# Patient Record
Sex: MALE | Race: WHITE
[De-identification: names, ages, dates, MRNs, and addresses within clinical notes are randomized per-mention and may not be internally consistent; named-entity substitution may affect disease eponyms.]

---

## 2019-09-20 ENCOUNTER — HOSPITAL ENCOUNTER (INPATIENT)
Dept: HOSPITAL 84 - D.ER | Age: 84
LOS: 7 days | Discharge: HOME | DRG: 208 | End: 2019-09-27
Attending: FAMILY MEDICINE | Admitting: FAMILY MEDICINE
Payer: MEDICARE

## 2019-09-20 VITALS — SYSTOLIC BLOOD PRESSURE: 109 MMHG | DIASTOLIC BLOOD PRESSURE: 48 MMHG

## 2019-09-20 VITALS — DIASTOLIC BLOOD PRESSURE: 48 MMHG | SYSTOLIC BLOOD PRESSURE: 119 MMHG

## 2019-09-20 VITALS — SYSTOLIC BLOOD PRESSURE: 81 MMHG | DIASTOLIC BLOOD PRESSURE: 38 MMHG

## 2019-09-20 VITALS — DIASTOLIC BLOOD PRESSURE: 44 MMHG | SYSTOLIC BLOOD PRESSURE: 98 MMHG

## 2019-09-20 VITALS — SYSTOLIC BLOOD PRESSURE: 97 MMHG | DIASTOLIC BLOOD PRESSURE: 44 MMHG

## 2019-09-20 VITALS — DIASTOLIC BLOOD PRESSURE: 37 MMHG | SYSTOLIC BLOOD PRESSURE: 79 MMHG

## 2019-09-20 VITALS — DIASTOLIC BLOOD PRESSURE: 56 MMHG | SYSTOLIC BLOOD PRESSURE: 122 MMHG

## 2019-09-20 VITALS — DIASTOLIC BLOOD PRESSURE: 40 MMHG | SYSTOLIC BLOOD PRESSURE: 113 MMHG

## 2019-09-20 VITALS
BODY MASS INDEX: 26.98 KG/M2 | BODY MASS INDEX: 26.98 KG/M2 | WEIGHT: 171.92 LBS | BODY MASS INDEX: 26.98 KG/M2 | BODY MASS INDEX: 26.98 KG/M2 | HEIGHT: 67 IN | WEIGHT: 171.92 LBS | HEIGHT: 67 IN

## 2019-09-20 VITALS — SYSTOLIC BLOOD PRESSURE: 109 MMHG | DIASTOLIC BLOOD PRESSURE: 40 MMHG

## 2019-09-20 VITALS — DIASTOLIC BLOOD PRESSURE: 51 MMHG | SYSTOLIC BLOOD PRESSURE: 109 MMHG

## 2019-09-20 VITALS — SYSTOLIC BLOOD PRESSURE: 127 MMHG | DIASTOLIC BLOOD PRESSURE: 48 MMHG

## 2019-09-20 VITALS — DIASTOLIC BLOOD PRESSURE: 45 MMHG | SYSTOLIC BLOOD PRESSURE: 99 MMHG

## 2019-09-20 VITALS — SYSTOLIC BLOOD PRESSURE: 115 MMHG | DIASTOLIC BLOOD PRESSURE: 49 MMHG

## 2019-09-20 VITALS — DIASTOLIC BLOOD PRESSURE: 49 MMHG | SYSTOLIC BLOOD PRESSURE: 130 MMHG

## 2019-09-20 VITALS — DIASTOLIC BLOOD PRESSURE: 51 MMHG | SYSTOLIC BLOOD PRESSURE: 129 MMHG

## 2019-09-20 VITALS — DIASTOLIC BLOOD PRESSURE: 45 MMHG | SYSTOLIC BLOOD PRESSURE: 117 MMHG

## 2019-09-20 VITALS — DIASTOLIC BLOOD PRESSURE: 39 MMHG | SYSTOLIC BLOOD PRESSURE: 82 MMHG

## 2019-09-20 VITALS — DIASTOLIC BLOOD PRESSURE: 36 MMHG | SYSTOLIC BLOOD PRESSURE: 92 MMHG

## 2019-09-20 VITALS — DIASTOLIC BLOOD PRESSURE: 42 MMHG | SYSTOLIC BLOOD PRESSURE: 105 MMHG

## 2019-09-20 VITALS — SYSTOLIC BLOOD PRESSURE: 114 MMHG | DIASTOLIC BLOOD PRESSURE: 40 MMHG

## 2019-09-20 VITALS — DIASTOLIC BLOOD PRESSURE: 40 MMHG | SYSTOLIC BLOOD PRESSURE: 86 MMHG

## 2019-09-20 VITALS — DIASTOLIC BLOOD PRESSURE: 43 MMHG | SYSTOLIC BLOOD PRESSURE: 116 MMHG

## 2019-09-20 VITALS — DIASTOLIC BLOOD PRESSURE: 38 MMHG | SYSTOLIC BLOOD PRESSURE: 108 MMHG

## 2019-09-20 VITALS — DIASTOLIC BLOOD PRESSURE: 45 MMHG | SYSTOLIC BLOOD PRESSURE: 114 MMHG

## 2019-09-20 VITALS — DIASTOLIC BLOOD PRESSURE: 47 MMHG | SYSTOLIC BLOOD PRESSURE: 111 MMHG

## 2019-09-20 VITALS — DIASTOLIC BLOOD PRESSURE: 47 MMHG | SYSTOLIC BLOOD PRESSURE: 99 MMHG

## 2019-09-20 VITALS — DIASTOLIC BLOOD PRESSURE: 47 MMHG | SYSTOLIC BLOOD PRESSURE: 117 MMHG

## 2019-09-20 VITALS — DIASTOLIC BLOOD PRESSURE: 60 MMHG | SYSTOLIC BLOOD PRESSURE: 131 MMHG

## 2019-09-20 VITALS — SYSTOLIC BLOOD PRESSURE: 118 MMHG | DIASTOLIC BLOOD PRESSURE: 48 MMHG

## 2019-09-20 VITALS — SYSTOLIC BLOOD PRESSURE: 133 MMHG | DIASTOLIC BLOOD PRESSURE: 48 MMHG

## 2019-09-20 VITALS — DIASTOLIC BLOOD PRESSURE: 42 MMHG | SYSTOLIC BLOOD PRESSURE: 112 MMHG

## 2019-09-20 VITALS — SYSTOLIC BLOOD PRESSURE: 105 MMHG | DIASTOLIC BLOOD PRESSURE: 53 MMHG

## 2019-09-20 VITALS — DIASTOLIC BLOOD PRESSURE: 40 MMHG | SYSTOLIC BLOOD PRESSURE: 97 MMHG

## 2019-09-20 VITALS — SYSTOLIC BLOOD PRESSURE: 109 MMHG | DIASTOLIC BLOOD PRESSURE: 45 MMHG

## 2019-09-20 VITALS — SYSTOLIC BLOOD PRESSURE: 126 MMHG | DIASTOLIC BLOOD PRESSURE: 48 MMHG

## 2019-09-20 VITALS — DIASTOLIC BLOOD PRESSURE: 63 MMHG | SYSTOLIC BLOOD PRESSURE: 92 MMHG

## 2019-09-20 VITALS — DIASTOLIC BLOOD PRESSURE: 44 MMHG | SYSTOLIC BLOOD PRESSURE: 110 MMHG

## 2019-09-20 VITALS — DIASTOLIC BLOOD PRESSURE: 44 MMHG | SYSTOLIC BLOOD PRESSURE: 95 MMHG

## 2019-09-20 VITALS — SYSTOLIC BLOOD PRESSURE: 131 MMHG | DIASTOLIC BLOOD PRESSURE: 49 MMHG

## 2019-09-20 VITALS — DIASTOLIC BLOOD PRESSURE: 47 MMHG | SYSTOLIC BLOOD PRESSURE: 106 MMHG

## 2019-09-20 DIAGNOSIS — E87.6: ICD-10-CM

## 2019-09-20 DIAGNOSIS — J15.6: ICD-10-CM

## 2019-09-20 DIAGNOSIS — I13.0: ICD-10-CM

## 2019-09-20 DIAGNOSIS — A41.9: ICD-10-CM

## 2019-09-20 DIAGNOSIS — I50.33: ICD-10-CM

## 2019-09-20 DIAGNOSIS — K72.00: ICD-10-CM

## 2019-09-20 DIAGNOSIS — N18.3: ICD-10-CM

## 2019-09-20 DIAGNOSIS — E03.9: ICD-10-CM

## 2019-09-20 DIAGNOSIS — M19.90: ICD-10-CM

## 2019-09-20 DIAGNOSIS — J44.1: ICD-10-CM

## 2019-09-20 DIAGNOSIS — J96.01: Primary | ICD-10-CM

## 2019-09-20 DIAGNOSIS — R65.21: ICD-10-CM

## 2019-09-20 DIAGNOSIS — J44.0: ICD-10-CM

## 2019-09-20 DIAGNOSIS — I21.4: ICD-10-CM

## 2019-09-20 LAB
ALBUMIN SERPL-MCNC: 2.8 G/DL (ref 3.4–5)
ALP SERPL-CCNC: 272 U/L (ref 46–116)
ALT SERPL-CCNC: 89 U/L (ref 10–68)
ANION GAP SERPL CALC-SCNC: 14.8 MMOL/L (ref 8–16)
APPEARANCE UR: (no result)
APTT BLD: 83.6 SECONDS (ref 22.8–39.4)
BACTERIA #/AREA URNS HPF: (no result) /HPF
BILIRUB SERPL-MCNC: 1.41 MG/DL (ref 0.2–1.3)
BILIRUB SERPL-MCNC: NEGATIVE MG/DL
BUN SERPL-MCNC: 46 MG/DL (ref 7–18)
CALCIUM SERPL-MCNC: 8.4 MG/DL (ref 8.5–10.1)
CHLORIDE SERPL-SCNC: 102 MMOL/L (ref 98–107)
CK MB SERPL-MCNC: 21.4 U/L (ref 0–3.6)
CK SERPL-CCNC: 147 UL (ref 21–232)
CO2 SERPL-SCNC: 23.1 MMOL/L (ref 21–32)
COLOR UR: YELLOW
CREAT SERPL-MCNC: 1.6 MG/DL (ref 0.6–1.3)
ERYTHROCYTE [DISTWIDTH] IN BLOOD BY AUTOMATED COUNT: 13.4 % (ref 11.5–14.5)
GLOBULIN SER-MCNC: 3.8 G/L
GLUCOSE SERPL-MCNC: 168 MG/DL (ref 74–106)
GLUCOSE SERPL-MCNC: NEGATIVE MG/DL
HCT VFR BLD CALC: 29.7 % (ref 42–54)
HGB BLD-MCNC: 10.2 G/DL (ref 13.5–17.5)
INR PPP: 1.83 (ref 0.85–1.17)
KETONES UR STRIP-MCNC: NEGATIVE MG/DL
LYMPHOCYTES NFR BLD AUTO: 5.9 % (ref 15–50)
MCH RBC QN AUTO: 29.7 PG (ref 26–34)
MCHC RBC AUTO-ENTMCNC: 34.3 G/DL (ref 31–37)
MCV RBC: 86.3 FL (ref 80–100)
MUCOUS THREADS #/AREA URNS LPF: (no result) /LPF
NEUTROPHILS NFR BLD AUTO: 86.6 % (ref 40–80)
NITRITE UR-MCNC: NEGATIVE MG/ML
OSMOLALITY SERPL CALC.SUM OF ELEC: 287 MOSM/KG (ref 275–300)
PH UR STRIP: 5 [PH] (ref 5–6)
PLATELET # BLD: 234 10X3/UL (ref 130–400)
PMV BLD AUTO: 8.8 FL (ref 7.4–10.4)
POTASSIUM SERPL-SCNC: 3.9 MMOL/L (ref 3.5–5.1)
PROT SERPL-MCNC: 6.6 G/DL (ref 6.4–8.2)
PROT UR-MCNC: NEGATIVE MG/DL
PROTHROMBIN TIME: 20.5 SECONDS (ref 11.6–15)
RBC # BLD AUTO: 3.44 10X6/UL (ref 4.2–6.1)
RBC #/AREA URNS HPF: (no result) /HPF (ref 0–5)
SODIUM SERPL-SCNC: 136 MMOL/L (ref 136–145)
SP GR UR STRIP: 1.01 (ref 1–1.02)
SQUAMOUS #/AREA URNS HPF: (no result) /HPF (ref 0–5)
TROPONIN I SERPL-MCNC: 4.85 NG/ML (ref 0–0.06)
UROBILINOGEN UR-MCNC: NORMAL MG/DL
WBC # BLD AUTO: 15.7 10X3/UL (ref 4.8–10.8)
WBC #/AREA URNS HPF: (no result) /HPF

## 2019-09-20 PROCEDURE — 05HM33Z INSERTION OF INFUSION DEVICE INTO RIGHT INTERNAL JUGULAR VEIN, PERCUTANEOUS APPROACH: ICD-10-PCS | Performed by: SURGERY

## 2019-09-20 PROCEDURE — 5A1945Z RESPIRATORY VENTILATION, 24-96 CONSECUTIVE HOURS: ICD-10-PCS | Performed by: FAMILY MEDICINE

## 2019-09-20 PROCEDURE — 05HY33Z INSERTION OF INFUSION DEVICE INTO UPPER VEIN, PERCUTANEOUS APPROACH: ICD-10-PCS | Performed by: FAMILY MEDICINE

## 2019-09-20 NOTE — NUR
OBTAINED CONSENT FROM DAUGHTER FOR CENTRAL LINE SINCE PICC LINE COULD NOT BE
PLACED.  DR. MOSLEY CONSULTED.  SUPPLIES GATHERED

## 2019-09-20 NOTE — NUR
LAB CALLED FOR ELEVATED TROPONIN OF 4.846, EDP NOTIFIED. EDP STATES TO CALL DR CARNEY. DR CARNEY NOTIFIED.

## 2019-09-20 NOTE — MORECARE
CASE MANAGEMENT DISCHARGE SUMMARY
 
 
PATIENT: CHINEDU DEL TORO                       UNIT: [id number]
ACCOUNT#: [id number]                       ADM DATE: 19
AGE: 89     : [birth year]  SEX: M            ROOM/BED: D.2305    
AUTHOR: KATHY HUBER                             PHYSICIAN:                               
 
REFERRING PHYSICIAN: STAN COVARRUBIAS MD           
DATE OF SERVICE: 19
Discharge Plan
 
 
Patient Name: CHINEDU DEL TORO
[facility]
Encounter #: [id number]
Medical Record #: [id number]
: 1930
Planned Disposition: 
Anticipated Discharge Date: 
 
Discharge Date: 
Expected LOS: 
Initial Reviewer: [id number]
Initial Review Date: 2019
Generated: 19   6:25 pm 
Comments
 
DCP- Discharge Planning
 
Updated by [email or web address]: Abbi Pike on 19   4:22 pm CT
CM attempted to visit with patient regarding discharge planning/ needs. 
Patient currently on vent no family available. CM will continue to follow and 
assist as needed with discharge planning / needs
  
 
 
 
 
 
 
 
Patient Name: CHINEDU DEL TORO
 
Encounter #: [id number]
Page [id number]
 
 
 
 
 
Electronically Signed by KATHY HUBER on 19 at 1726
 
 
 
 
 
 
**All edits/amendments must be made on the electronic document**
 
DICTATION DATE: 19     : CHRIS  19     
RPT#: [id number]                                DC DATE:        
                                               STATUS: ADM IN  
[facility]
 [place] [address]
***END OF REPORT***

## 2019-09-20 NOTE — NUR
PT LYING QUIETLY, INTUBATED, ON VENT.  P/W/D.  VSS.  PROPOFOL INFUSING, LR
INFUSING ON PRESSURE BAG, NOREPINEPHRINE INFUSING.  MAP 75.  1000ML TEA COLORED
URINE EMPTIED FROM WADSWORTH BAG.

## 2019-09-21 VITALS — DIASTOLIC BLOOD PRESSURE: 36 MMHG | SYSTOLIC BLOOD PRESSURE: 94 MMHG

## 2019-09-21 VITALS — DIASTOLIC BLOOD PRESSURE: 57 MMHG | SYSTOLIC BLOOD PRESSURE: 138 MMHG

## 2019-09-21 VITALS — SYSTOLIC BLOOD PRESSURE: 117 MMHG | DIASTOLIC BLOOD PRESSURE: 44 MMHG

## 2019-09-21 VITALS — DIASTOLIC BLOOD PRESSURE: 49 MMHG | SYSTOLIC BLOOD PRESSURE: 85 MMHG

## 2019-09-21 VITALS — SYSTOLIC BLOOD PRESSURE: 112 MMHG | DIASTOLIC BLOOD PRESSURE: 46 MMHG

## 2019-09-21 VITALS — SYSTOLIC BLOOD PRESSURE: 133 MMHG | DIASTOLIC BLOOD PRESSURE: 53 MMHG

## 2019-09-21 VITALS — DIASTOLIC BLOOD PRESSURE: 41 MMHG | SYSTOLIC BLOOD PRESSURE: 93 MMHG

## 2019-09-21 VITALS — DIASTOLIC BLOOD PRESSURE: 42 MMHG | SYSTOLIC BLOOD PRESSURE: 108 MMHG

## 2019-09-21 VITALS — SYSTOLIC BLOOD PRESSURE: 126 MMHG | DIASTOLIC BLOOD PRESSURE: 43 MMHG

## 2019-09-21 VITALS — SYSTOLIC BLOOD PRESSURE: 92 MMHG | DIASTOLIC BLOOD PRESSURE: 42 MMHG

## 2019-09-21 VITALS — DIASTOLIC BLOOD PRESSURE: 47 MMHG | SYSTOLIC BLOOD PRESSURE: 121 MMHG

## 2019-09-21 VITALS — SYSTOLIC BLOOD PRESSURE: 114 MMHG | DIASTOLIC BLOOD PRESSURE: 42 MMHG

## 2019-09-21 VITALS — DIASTOLIC BLOOD PRESSURE: 41 MMHG | SYSTOLIC BLOOD PRESSURE: 130 MMHG

## 2019-09-21 VITALS — DIASTOLIC BLOOD PRESSURE: 48 MMHG | SYSTOLIC BLOOD PRESSURE: 115 MMHG

## 2019-09-21 VITALS — DIASTOLIC BLOOD PRESSURE: 46 MMHG | SYSTOLIC BLOOD PRESSURE: 115 MMHG

## 2019-09-21 VITALS — SYSTOLIC BLOOD PRESSURE: 122 MMHG | DIASTOLIC BLOOD PRESSURE: 48 MMHG

## 2019-09-21 VITALS — SYSTOLIC BLOOD PRESSURE: 119 MMHG | DIASTOLIC BLOOD PRESSURE: 51 MMHG

## 2019-09-21 VITALS — DIASTOLIC BLOOD PRESSURE: 47 MMHG | SYSTOLIC BLOOD PRESSURE: 120 MMHG

## 2019-09-21 VITALS — SYSTOLIC BLOOD PRESSURE: 90 MMHG | DIASTOLIC BLOOD PRESSURE: 45 MMHG

## 2019-09-21 VITALS — DIASTOLIC BLOOD PRESSURE: 52 MMHG | SYSTOLIC BLOOD PRESSURE: 140 MMHG

## 2019-09-21 VITALS — DIASTOLIC BLOOD PRESSURE: 48 MMHG | SYSTOLIC BLOOD PRESSURE: 103 MMHG

## 2019-09-21 VITALS — DIASTOLIC BLOOD PRESSURE: 46 MMHG | SYSTOLIC BLOOD PRESSURE: 118 MMHG

## 2019-09-21 VITALS — SYSTOLIC BLOOD PRESSURE: 108 MMHG | DIASTOLIC BLOOD PRESSURE: 48 MMHG

## 2019-09-21 VITALS — SYSTOLIC BLOOD PRESSURE: 115 MMHG | DIASTOLIC BLOOD PRESSURE: 50 MMHG

## 2019-09-21 VITALS — DIASTOLIC BLOOD PRESSURE: 46 MMHG | SYSTOLIC BLOOD PRESSURE: 121 MMHG

## 2019-09-21 VITALS — DIASTOLIC BLOOD PRESSURE: 49 MMHG | SYSTOLIC BLOOD PRESSURE: 124 MMHG

## 2019-09-21 VITALS — SYSTOLIC BLOOD PRESSURE: 101 MMHG | DIASTOLIC BLOOD PRESSURE: 42 MMHG

## 2019-09-21 VITALS — DIASTOLIC BLOOD PRESSURE: 51 MMHG | SYSTOLIC BLOOD PRESSURE: 148 MMHG

## 2019-09-21 LAB
ALBUMIN SERPL-MCNC: 2.3 G/DL (ref 3.4–5)
ALP SERPL-CCNC: 187 U/L (ref 46–116)
ALT SERPL-CCNC: 57 U/L (ref 10–68)
ANION GAP SERPL CALC-SCNC: 15.3 MMOL/L (ref 8–16)
BASOPHILS NFR BLD AUTO: 0.1 % (ref 0–2)
BILIRUB SERPL-MCNC: 1.06 MG/DL (ref 0.2–1.3)
BUN SERPL-MCNC: 46 MG/DL (ref 7–18)
CALCIUM SERPL-MCNC: 7.9 MG/DL (ref 8.5–10.1)
CHLORIDE SERPL-SCNC: 104 MMOL/L (ref 98–107)
CO2 SERPL-SCNC: 23.9 MMOL/L (ref 21–32)
CREAT SERPL-MCNC: 1.5 MG/DL (ref 0.6–1.3)
EOSINOPHIL NFR BLD: 0.1 % (ref 0–7)
ERYTHROCYTE [DISTWIDTH] IN BLOOD BY AUTOMATED COUNT: 14 % (ref 11.5–14.5)
GLOBULIN SER-MCNC: 2.8 G/L
GLUCOSE SERPL-MCNC: 142 MG/DL (ref 74–106)
HCT VFR BLD CALC: 25 % (ref 42–54)
HGB BLD-MCNC: 8.5 G/DL (ref 13.5–17.5)
IMM GRANULOCYTES NFR BLD: 0.5 % (ref 0–5)
LYMPHOCYTES NFR BLD AUTO: 9.8 % (ref 15–50)
MAGNESIUM SERPL-MCNC: 1.9 MG/DL (ref 1.8–2.4)
MCH RBC QN AUTO: 28.6 PG (ref 26–34)
MCHC RBC AUTO-ENTMCNC: 34 G/DL (ref 31–37)
MCV RBC: 84.2 FL (ref 80–100)
MONOCYTES NFR BLD: 4 % (ref 2–11)
NEUTROPHILS NFR BLD AUTO: 85.5 % (ref 40–80)
OSMOLALITY SERPL CALC.SUM OF ELEC: 292 MOSM/KG (ref 275–300)
PHOSPHATE SERPL-MCNC: 4.3 MG/DL (ref 2.5–4.9)
PLATELET # BLD: 189 10X3/UL (ref 130–400)
PMV BLD AUTO: 8.7 FL (ref 7.4–10.4)
POTASSIUM SERPL-SCNC: 3.2 MMOL/L (ref 3.5–5.1)
PROT SERPL-MCNC: 5.1 G/DL (ref 6.4–8.2)
RBC # BLD AUTO: 2.97 10X6/UL (ref 4.2–6.1)
SODIUM SERPL-SCNC: 140 MMOL/L (ref 136–145)
TROPONIN I SERPL-MCNC: 2.55 NG/ML (ref 0–0.06)
VANCOMYCIN SERPL-MCNC: 0.1 UG/ML (ref 10–20)
WBC # BLD AUTO: 7.5 10X3/UL (ref 4.8–10.8)

## 2019-09-21 NOTE — OP
PATIENT NAME:  CHINEDU DEL TORO                             MEDICAL RECORD: [id number]
:30                                             LOCATION:.[facility]    D.2305
                                                         ADMISSION DATE:19
SURGEON:  YUVAL MOSLEY MD            
 
 
DATE OF OPERATION:  2019
 
PREOPERATIVE DIAGNOSES:
1.  Septicemia.
2.  Pneumonia.
3.  Ventilatory failure requiring mechanical ventilation.
 
POSTOPERATIVE DIAGNOSES:
1. Septicemia.
2. Pneumonia.
3. Ventilatory failure requiring mechanical ventilation.
 
PROCEDURE:  Insertion of right internal jugular triple lumen central venous
catheter, 16 cm.
 
SURGEON:  Yuval Mosley MD
 
ASSISTANT:  None.
 
BLOOD LOSS:  Minimal.
 
ANESTHESIA:  Local.
 
The entire procedure was performed in the presence of the patient's nurse.
 
OPERATIVE COURSE:  The patient was positioned in the Trendelenburg position. 
The right neck was sterilely prepped and draped.  A local anesthetic was used to
infiltrate the skin and subcutaneous tissues at the base of right neck.  Right
internal jugular vein was percutaneously accessed in an antegrade fashion. 
Guidewire was passed easily.  A small skin nick was accomplished.  A vessel
dilator was used to dilate a subcutaneous tract.  A 16-cm triple lumen central
venous catheter was inserted in to the hub.  It was sutured in place times 2. 
All lumens flushed easily and aspirated dark, nonpulsatile blood.
 
A stat portable chest x-ray is pending.
 
TRANSINT:[id number] Voice Confirmation ID: [id number] DOCUMENT ID: [id number]
                                           
                                           YUVAL MOSLEY MD            
 
 
 
Electronically Signed by YUVAL MOSLEY on 19 at 1702
 
CC:                                                             [id number]
DICTATION DATE: 19     :     19 0309      ADM IN  
                                                                              
[id number] [place] [address]

## 2019-09-21 NOTE — NUR
REASSESSMENT MADE CPOC REPOSITIONED FOR COMFORT PT LIGHTLY SEDATED STILL NODS
HEAD AND FOLLOWS COMMANDS

## 2019-09-22 VITALS — DIASTOLIC BLOOD PRESSURE: 55 MMHG | SYSTOLIC BLOOD PRESSURE: 130 MMHG

## 2019-09-22 VITALS — SYSTOLIC BLOOD PRESSURE: 135 MMHG | DIASTOLIC BLOOD PRESSURE: 54 MMHG

## 2019-09-22 VITALS — SYSTOLIC BLOOD PRESSURE: 129 MMHG | DIASTOLIC BLOOD PRESSURE: 53 MMHG

## 2019-09-22 VITALS — SYSTOLIC BLOOD PRESSURE: 131 MMHG | DIASTOLIC BLOOD PRESSURE: 56 MMHG

## 2019-09-22 VITALS — SYSTOLIC BLOOD PRESSURE: 125 MMHG | DIASTOLIC BLOOD PRESSURE: 48 MMHG

## 2019-09-22 VITALS — DIASTOLIC BLOOD PRESSURE: 55 MMHG | SYSTOLIC BLOOD PRESSURE: 125 MMHG

## 2019-09-22 VITALS — SYSTOLIC BLOOD PRESSURE: 131 MMHG | DIASTOLIC BLOOD PRESSURE: 50 MMHG

## 2019-09-22 VITALS — SYSTOLIC BLOOD PRESSURE: 116 MMHG | DIASTOLIC BLOOD PRESSURE: 46 MMHG

## 2019-09-22 VITALS — DIASTOLIC BLOOD PRESSURE: 43 MMHG | SYSTOLIC BLOOD PRESSURE: 107 MMHG

## 2019-09-22 VITALS — DIASTOLIC BLOOD PRESSURE: 48 MMHG | SYSTOLIC BLOOD PRESSURE: 126 MMHG

## 2019-09-22 VITALS — DIASTOLIC BLOOD PRESSURE: 56 MMHG | SYSTOLIC BLOOD PRESSURE: 142 MMHG

## 2019-09-22 VITALS — DIASTOLIC BLOOD PRESSURE: 49 MMHG | SYSTOLIC BLOOD PRESSURE: 125 MMHG

## 2019-09-22 VITALS — DIASTOLIC BLOOD PRESSURE: 60 MMHG | SYSTOLIC BLOOD PRESSURE: 133 MMHG

## 2019-09-22 VITALS — SYSTOLIC BLOOD PRESSURE: 133 MMHG | DIASTOLIC BLOOD PRESSURE: 53 MMHG

## 2019-09-22 VITALS — DIASTOLIC BLOOD PRESSURE: 46 MMHG | SYSTOLIC BLOOD PRESSURE: 149 MMHG

## 2019-09-22 VITALS — SYSTOLIC BLOOD PRESSURE: 124 MMHG | DIASTOLIC BLOOD PRESSURE: 64 MMHG

## 2019-09-22 VITALS — DIASTOLIC BLOOD PRESSURE: 50 MMHG | SYSTOLIC BLOOD PRESSURE: 138 MMHG

## 2019-09-22 VITALS — SYSTOLIC BLOOD PRESSURE: 123 MMHG | DIASTOLIC BLOOD PRESSURE: 47 MMHG

## 2019-09-22 VITALS — DIASTOLIC BLOOD PRESSURE: 87 MMHG | SYSTOLIC BLOOD PRESSURE: 139 MMHG

## 2019-09-22 VITALS — SYSTOLIC BLOOD PRESSURE: 131 MMHG | DIASTOLIC BLOOD PRESSURE: 60 MMHG

## 2019-09-22 VITALS — SYSTOLIC BLOOD PRESSURE: 143 MMHG | DIASTOLIC BLOOD PRESSURE: 62 MMHG

## 2019-09-22 VITALS — DIASTOLIC BLOOD PRESSURE: 38 MMHG | SYSTOLIC BLOOD PRESSURE: 101 MMHG

## 2019-09-22 VITALS — DIASTOLIC BLOOD PRESSURE: 45 MMHG | SYSTOLIC BLOOD PRESSURE: 126 MMHG

## 2019-09-22 VITALS — SYSTOLIC BLOOD PRESSURE: 129 MMHG | DIASTOLIC BLOOD PRESSURE: 58 MMHG

## 2019-09-22 VITALS — SYSTOLIC BLOOD PRESSURE: 143 MMHG | DIASTOLIC BLOOD PRESSURE: 61 MMHG

## 2019-09-22 VITALS — SYSTOLIC BLOOD PRESSURE: 124 MMHG | DIASTOLIC BLOOD PRESSURE: 74 MMHG

## 2019-09-22 VITALS — SYSTOLIC BLOOD PRESSURE: 137 MMHG | DIASTOLIC BLOOD PRESSURE: 59 MMHG

## 2019-09-22 LAB
ANION GAP SERPL CALC-SCNC: 11.5 MMOL/L (ref 8–16)
APTT BLD: 44.5 SECONDS (ref 22.8–39.4)
BASOPHILS NFR BLD AUTO: 0 % (ref 0–2)
BUN SERPL-MCNC: 44 MG/DL (ref 7–18)
CALCIUM SERPL-MCNC: 7.7 MG/DL (ref 8.5–10.1)
CHLORIDE SERPL-SCNC: 108 MMOL/L (ref 98–107)
CO2 SERPL-SCNC: 25.8 MMOL/L (ref 21–32)
CREAT SERPL-MCNC: 1.5 MG/DL (ref 0.6–1.3)
EOSINOPHIL NFR BLD: 0.1 % (ref 0–7)
ERYTHROCYTE [DISTWIDTH] IN BLOOD BY AUTOMATED COUNT: 14.1 % (ref 11.5–14.5)
GLUCOSE SERPL-MCNC: 152 MG/DL (ref 74–106)
HCT VFR BLD CALC: 23.8 % (ref 42–54)
HCT VFR BLD CALC: 30.3 % (ref 42–54)
HGB BLD-MCNC: 10.3 G/DL (ref 13.5–17.5)
HGB BLD-MCNC: 8 G/DL (ref 13.5–17.5)
IMM GRANULOCYTES NFR BLD: 0.5 % (ref 0–5)
INR PPP: 2.14 (ref 0.85–1.17)
LYMPHOCYTES NFR BLD AUTO: 8.8 % (ref 15–50)
MCH RBC QN AUTO: 28.4 PG (ref 26–34)
MCHC RBC AUTO-ENTMCNC: 33.6 G/DL (ref 31–37)
MCV RBC: 84.4 FL (ref 80–100)
MONOCYTES NFR BLD: 3.1 % (ref 2–11)
NEUTROPHILS NFR BLD AUTO: 87.5 % (ref 40–80)
OSMOLALITY SERPL CALC.SUM OF ELEC: 296 MOSM/KG (ref 275–300)
PLATELET # BLD: 202 10X3/UL (ref 130–400)
PMV BLD AUTO: 8.9 FL (ref 7.4–10.4)
POTASSIUM SERPL-SCNC: 3.3 MMOL/L (ref 3.5–5.1)
PROTHROMBIN TIME: 23.3 SECONDS (ref 11.6–15)
RBC # BLD AUTO: 2.82 10X6/UL (ref 4.2–6.1)
SODIUM SERPL-SCNC: 142 MMOL/L (ref 136–145)
VANCOMYCIN SERPL-MCNC: 8.1 UG/ML (ref 10–20)
WBC # BLD AUTO: 7.5 10X3/UL (ref 4.8–10.8)

## 2019-09-22 NOTE — NUR
RECEIVED BEDSIDE REPORT ON PATIENT AND ASSUMED CARE. PATIENT AWAKENS TO VOICE,
FOLLOWS COMMANDS. VSS. CM - SB RATE 75, SPO2 - 98% ON VENT WIHT 30% FIO2. IVS
INFUSING TO RIGHT IJ WITHOUT DIFFICULTY, LR  CC/HR, FENTANYL AT 1 CC/HR
(50 MCG/HR) AND PROPOFOL AT 3.1 CC/HR (8 MCG/KG/MIN). WADSWORTH CATH IN PLACE WITH
YELLOW UOP. PATIENT TURNED AND REPOSITIONED IN BED. HEAD TO TOE ASSESSMENT
COMPLETED.

## 2019-09-22 NOTE — NUR
SPOKE TO DR. HAHN REGARDING PATIENTS EYE GTTS TAKEN FOR HIS GLAUCOMA, STATES
OK TO ORDER TO THESE MEDS. ORDER PLACED.

## 2019-09-22 NOTE — NUR
REPORT RECEIVED. RECEIVED PATIENT IN BED. AWAKE AND ALERT. SEDATED/INTUBATED.
ETT INTACT/SECURE/PATENT CONNECTED TO MECHANICAL VENT WITH SETTINGS AS
ORDERED. HOB UP 30 DEGREES. OGT INTACT/SECURE AND PATENT. MONITORS CONNECTED
TO PATIENT WITH ALARMS SET. VSS

## 2019-09-22 NOTE — NUR
CONTACTED PATIENTS DAUGHTER KACIE FERNANDEZ (C) [phone] AND OBTAINED
CONSENT FOR BLOOD TRANSFUSION. UPDATED ON FATHERS CONDITION AND ANSWERED
QUESTIONS. (V) [phone].

## 2019-09-23 VITALS — DIASTOLIC BLOOD PRESSURE: 59 MMHG | SYSTOLIC BLOOD PRESSURE: 150 MMHG

## 2019-09-23 VITALS — SYSTOLIC BLOOD PRESSURE: 148 MMHG | DIASTOLIC BLOOD PRESSURE: 75 MMHG

## 2019-09-23 VITALS — DIASTOLIC BLOOD PRESSURE: 53 MMHG | SYSTOLIC BLOOD PRESSURE: 142 MMHG

## 2019-09-23 VITALS — SYSTOLIC BLOOD PRESSURE: 140 MMHG | DIASTOLIC BLOOD PRESSURE: 56 MMHG

## 2019-09-23 VITALS — DIASTOLIC BLOOD PRESSURE: 58 MMHG | SYSTOLIC BLOOD PRESSURE: 144 MMHG

## 2019-09-23 VITALS — SYSTOLIC BLOOD PRESSURE: 153 MMHG | DIASTOLIC BLOOD PRESSURE: 57 MMHG

## 2019-09-23 VITALS — DIASTOLIC BLOOD PRESSURE: 52 MMHG | SYSTOLIC BLOOD PRESSURE: 141 MMHG

## 2019-09-23 VITALS — SYSTOLIC BLOOD PRESSURE: 156 MMHG | DIASTOLIC BLOOD PRESSURE: 72 MMHG

## 2019-09-23 VITALS — DIASTOLIC BLOOD PRESSURE: 60 MMHG | SYSTOLIC BLOOD PRESSURE: 141 MMHG

## 2019-09-23 VITALS — DIASTOLIC BLOOD PRESSURE: 59 MMHG | SYSTOLIC BLOOD PRESSURE: 145 MMHG

## 2019-09-23 VITALS — DIASTOLIC BLOOD PRESSURE: 56 MMHG | SYSTOLIC BLOOD PRESSURE: 140 MMHG

## 2019-09-23 VITALS — SYSTOLIC BLOOD PRESSURE: 127 MMHG | DIASTOLIC BLOOD PRESSURE: 54 MMHG

## 2019-09-23 VITALS — DIASTOLIC BLOOD PRESSURE: 64 MMHG | SYSTOLIC BLOOD PRESSURE: 146 MMHG

## 2019-09-23 VITALS — DIASTOLIC BLOOD PRESSURE: 53 MMHG | SYSTOLIC BLOOD PRESSURE: 133 MMHG

## 2019-09-23 VITALS — DIASTOLIC BLOOD PRESSURE: 55 MMHG | SYSTOLIC BLOOD PRESSURE: 147 MMHG

## 2019-09-23 VITALS — SYSTOLIC BLOOD PRESSURE: 133 MMHG | DIASTOLIC BLOOD PRESSURE: 55 MMHG

## 2019-09-23 VITALS — DIASTOLIC BLOOD PRESSURE: 51 MMHG | SYSTOLIC BLOOD PRESSURE: 132 MMHG

## 2019-09-23 VITALS — DIASTOLIC BLOOD PRESSURE: 64 MMHG | SYSTOLIC BLOOD PRESSURE: 150 MMHG

## 2019-09-23 VITALS — SYSTOLIC BLOOD PRESSURE: 149 MMHG | DIASTOLIC BLOOD PRESSURE: 59 MMHG

## 2019-09-23 VITALS — DIASTOLIC BLOOD PRESSURE: 55 MMHG | SYSTOLIC BLOOD PRESSURE: 137 MMHG

## 2019-09-23 VITALS — SYSTOLIC BLOOD PRESSURE: 120 MMHG | DIASTOLIC BLOOD PRESSURE: 50 MMHG

## 2019-09-23 VITALS — SYSTOLIC BLOOD PRESSURE: 150 MMHG | DIASTOLIC BLOOD PRESSURE: 55 MMHG

## 2019-09-23 LAB
ANION GAP SERPL CALC-SCNC: 12.5 MMOL/L (ref 8–16)
APTT BLD: 36.7 SECONDS (ref 22.8–39.4)
BASOPHILS NFR BLD AUTO: 0 % (ref 0–2)
BUN SERPL-MCNC: 45 MG/DL (ref 7–18)
CALCIUM SERPL-MCNC: 7.7 MG/DL (ref 8.5–10.1)
CHLORIDE SERPL-SCNC: 107 MMOL/L (ref 98–107)
CO2 SERPL-SCNC: 25.7 MMOL/L (ref 21–32)
CREAT SERPL-MCNC: 1.5 MG/DL (ref 0.6–1.3)
EOSINOPHIL NFR BLD: 0 % (ref 0–7)
ERYTHROCYTE [DISTWIDTH] IN BLOOD BY AUTOMATED COUNT: 14.4 % (ref 11.5–14.5)
GLUCOSE SERPL-MCNC: 181 MG/DL (ref 74–106)
HCT VFR BLD CALC: 29.7 % (ref 42–54)
HGB BLD-MCNC: 10 G/DL (ref 13.5–17.5)
IMM GRANULOCYTES NFR BLD: 1.4 % (ref 0–5)
INR PPP: 2.08 (ref 0.85–1.17)
LYMPHOCYTES NFR BLD AUTO: 6.5 % (ref 15–50)
MAGNESIUM SERPL-MCNC: 2 MG/DL (ref 1.8–2.4)
MCH RBC QN AUTO: 28.9 PG (ref 26–34)
MCHC RBC AUTO-ENTMCNC: 33.7 G/DL (ref 31–37)
MCV RBC: 85.8 FL (ref 80–100)
MONOCYTES NFR BLD: 3.2 % (ref 2–11)
NEUTROPHILS NFR BLD AUTO: 88.9 % (ref 40–80)
OSMOLALITY SERPL CALC.SUM OF ELEC: 297 MOSM/KG (ref 275–300)
PHOSPHATE SERPL-MCNC: 2.8 MG/DL (ref 2.5–4.9)
PLATELET # BLD: 210 10X3/UL (ref 130–400)
PMV BLD AUTO: 9.1 FL (ref 7.4–10.4)
POTASSIUM SERPL-SCNC: 4.2 MMOL/L (ref 3.5–5.1)
PROTHROMBIN TIME: 22.7 SECONDS (ref 11.6–15)
RBC # BLD AUTO: 3.46 10X6/UL (ref 4.2–6.1)
SODIUM SERPL-SCNC: 141 MMOL/L (ref 136–145)
VANCOMYCIN SERPL-MCNC: 10.4 UG/ML (ref 10–20)
WBC # BLD AUTO: 7.7 10X3/UL (ref 4.8–10.8)

## 2019-09-23 NOTE — MORECARE
CASE MANAGEMENT DISCHARGE SUMMARY
 
 
PATIENT: CHIENDU DEL TORO                       UNIT: [id number]
ACCOUNT#: [id number]                       ADM DATE: 19
AGE: 89     : 30  SEX: M            ROOM/BED: D.2305    
AUTHOR: KATHY HUBER                             PHYSICIAN:                               
 
REFERRING PHYSICIAN: STAN COVARRUBIAS MD           
DATE OF SERVICE: 19
Discharge Plan
 
 
Patient Name: CHINEDU DEL TORO
Facility: [facility]:[place]
Encounter #: [id number]
Medical Record #: [id number]
: 1930
Planned Disposition: Home or Self Care
Anticipated Discharge Date: 
 
Discharge Date: 
Expected LOS: 
Initial Reviewer: [email or web address]
Initial Review Date: 2019
Generated: 19  10:34 pm 
DCP- Discharge Planning
 
Updated by KOJO Pike on 19   4:22 pm CT
CM attempted to visit with patient regarding discharge planning/ needs. 
Patient currently on vent no family available. CM will continue to follow and 
assist as needed with discharge planning / needs
 DCPIA - Discharge Planning Initial Assessment
 
Updated by TALIA: Abbi Pike on 19   9:32 pm
*  Is the patient Alert and Oriented?
Yes
*  How many steps to enter\exit or inside your home?
 
[facility]
 
*  Preadmission Environment
Home with Family
*  ADLs
Independent
*  Equipment
Nebulizer
 
*  Other Equipment
CPAP, CANE
*  List name and contact numbers for known caregivers / representatives who 
currently or will assist patient after discharge:
MUKESH LAM - SIGNIFICANT OTHER- [phone]  KACIE  DAUGHTER - [phone]
*  Verbal permission to speak to the caregivers and representatives has been 
obtained from the patient.
No
*  Community resources currently utilized
None
*  Additional services required to return to the preadmission environment?
No
*  Can the patient safely return to the preadmission environment?
Yes
*  Has this patient been hospitalized within the prior 30 days at any 
hospital?
No
 
 
 
 
 
 
 
Last DP export: 19   4:26 p
Patient Name: CHINEDU DEL TORO
Encounter #: [id number]
Page [id number]
 
 
 
 
 
Electronically Signed by KATHY HUBER on 19 at 3497
 
 
 
 
 
 
**All edits/amendments must be made on the electronic document**
 
DICTATION DATE: 19     : DM  19     
RPT#: [id number]                                DC DATE:        
                                               STATUS: ADM IN  
[facility]
[address] [place] [address]
***END OF REPORT***

## 2019-09-23 NOTE — NUR
Nutrition follow-up:
TF off at this time due to CPAP trials
Pulmocare infusing @ 40 ml/hr with increase to goal rate of 45 ml/hr today
unless pt extubates.
Labs reviewed
WT: 169#
RDN following.

## 2019-09-23 NOTE — NUR
report recieved. patient is intubated. no sedation. recieved patient on
fentanyl while on cpap trials. patient is calm and cooperative. diprivan off.
denies pain. nods yes and no when asked questions. pulses palp bilateral upp
and low. murmur heard on ausculation. hob 30. tube feeding at 40.

## 2019-09-23 NOTE — NUR
PATIENT IS RESTING. TOLERATING ICE CHIPS WITH NO SIGNS OF ASPIRATION. PATIENT
IS DOING INCENTIVE SPIROMETER AT THIS TIME. INSTRUCTED ON DOING IT 10 TIMES IN
ONE HOUR. EDUCATED ON FLUTTER VALVE AND THE REASON FOR ICE CHIPS ONLY.

## 2019-09-23 NOTE — MORECARE
CASE MANAGEMENT DISCHARGE SUMMARY
 
 
PATIENT: CHINEDU DEL TORO                       UNIT: [id number]
ACCOUNT#: [id number]                       ADM DATE: 19
AGE: 89     : 30  SEX: M            ROOM/BED: D.2305    
AUTHOR: MAYO,DOC                             PHYSICIAN:                               
 
REFERRING PHYSICIAN: STAN COVARRUBIAS MD           
DATE OF SERVICE: 19
Discharge Plan
 
 
Patient Name: CHINEDU DEL TORO
Facility: [facility]:[place]
Encounter #: [id number]
Medical Record #: [id number]
: 1930
Planned Disposition: Home or Self Care
Anticipated Discharge Date: 
 
Discharge Date: 
Expected LOS: 
Initial Reviewer: [email or web address]
Initial Review Date: 2019
Generated: 19  10:40 pm 
Comments
 
DCP- Discharge Planning
 
Updated by [id number]: Abbi Pike on 19   8:35 pm CT
Patient Name: CHINEDU DEL TORO                                     
Admission Status: Elective   
Accout number: [id number]                              
Admission Date: 2019   
: 1930                                                        
Admission Diagnosis:   
Attending: SHAMIR COVARRUBIAS                                                
Current LOS:  3   
  
Anticipated DC Date:    
Planned Disposition: Home or Self Care   
Primary Insurance: MEDICARE A & B   
  
  
Discharge Planning Comments: CM met with patient at bedside after explaining 
CM role and obtaining verbal consent. Patient lives at home with his 
significant other Katelynn where he is independent with his care and plans to 
return there upon discharge.  Patient feels this would be a safe discharge.  
CM discussed availability / needs of home health and medical equipment.  
 
Patient states he has CPAP and Nebulizer [facility] 
Patient denies any discharge needs at this time. Patient states he will have 
his family drive him home upon discharge.  CM will continue to follow and 
assist as needed with discharge planning / needs.  
  
  
  
  
  
  
: Abbi Pike
DCP- Discharge Planning
 
Updated by [id number]: Abbi Pike on 19   4:22 pm CT
CM attempted to visit with patient regarding discharge planning/ needs. 
Patient currently on vent no family available. CM will continue to follow and 
assist as needed with discharge planning / needs
 DCPIA - Discharge Planning Initial Assessment
 
Updated by [id number]: Abbi Pike on 19   9:32 pm
*  Is the patient Alert and Oriented?
Yes
*  How many steps to enter\exit or inside your home?
 
*  [facility]
*  [facility]
 
*  Preadmission Environment
Home with Family
*  ADLs
Independent
*  Equipment
Nebulizer
*  Other Equipment
CPAP, CANE
*  List name and contact numbers for known caregivers / representatives who 
currently or will assist patient after discharge:
KATELYNN LAM - SIGNIFICANT OTHER- [phone]  KACIE - DAUGHTER - 
[phone]
*  Verbal permission to speak to the caregivers and representatives has been 
obtained from the patient.
No
*  Community resources currently utilized
None
*  Additional services required to return to the preadmission environment?
No
*  Can the patient safely return to the preadmission environment?
Yes
*  Has this patient been hospitalized within the prior 30 days at any 
hospital?
 
No
 
 
 
 
 
 
 
Last DP export: 19   8:34 p
Patient Name: CHINEDU DEL TORO
Encounter #: [id number]
Page 40621
 
 
 
 
 
Electronically Signed by KATHY HUBER on 19 at 2141
 
 
 
 
 
 
**All edits/amendments must be made on the electronic document**
 
DICTATION DATE: 19     : CHRIS  19     
RPT#: [id number]                                DC DATE:        
                                               STATUS: ADM IN  
[facility]
[address] [place] [address]
***END OF REPORT***

## 2019-09-23 NOTE — NUR
PO MEDS TAKEN WITHOUT DIFFICULTY. PT IS ABLE TO REPOSITION INDEPENDENTLY. HE
DENIES ANY NEEDS AT THIS TIME. VSS. CALL LIGHT IN REACH. PT IS IN [employer]
AND WATCHING TV. WILL CONT WITH POC.

## 2019-09-23 NOTE — NUR
SHIFT ASSESSMENT COMPLETE. PT IS A&O X4 WITH NO ACUTE DISTRESS NOTED. S1S2
AUDIBLE, NSR SHOWING ON MONITOR. RR EVEN AND UNLABORED, CLEAR LUNG SOUNDS
HEARD THROUGHOUT UPPER AND MIDDLE LOBES, DIMINISHED AT THE BASES. NC ON @
2L/MIN. ABD ROUND, BS ACTIVE X4. WADSWORTH CATH INTACT DRAINING YELLOW URINE. R IJ
CVL INFUSING LR @ 75 ML/HR. RADIAL AND PEDAL PULSES PALP. REFRESHMENTS BROUGHT
TO BEDSIDE. VSS. CALL LIGHT IN REACH, BED IN LOWEST POSITION. WILL CONT WITH
POC.

## 2019-09-23 NOTE — NUR
TEGADERM AND GAUZE PUT ON R ARM FOR SKIN TEAR. LOVENOX BEING HELD. HOB 60.
ALERT AND ORIENTED. NO BM. NO PAIN. PULSES PALP BILAT UPP AND LOWER. INCENTIVE
SPIROMETER BEING DONE. REINFORCED IMPORTANCE OF SPIROMETER AND FLUTTER VALVE.
EDUCATED THE SIGNIFICANT OTHER TO CONTINUE THESE BREATHING EXERCISES.

## 2019-09-23 NOTE — NUR
FAMILY AT BEDSIDE. AFEBRILE. NO DISTRESS. TOLERATING NASAL CANNULA. NO PAIN.
INCENTIVE SPIROMETER BEING DONE AT THIS TIME.

## 2019-09-23 NOTE — NUR
REASSESSMENT COMPLETE. ASSISTED WITH ADLs. NO CHANGES IN PT CONDIITON. PT
REQUESTS FOR TV AND LIGHTS TO BE TURNED OFF. NO FURTHER NEEDS AT THIS TIME.
SEE FLOWSHEET FOR FURTHER DETAILS. VSS. WILL CONT WITH POC.

## 2019-09-24 VITALS — SYSTOLIC BLOOD PRESSURE: 159 MMHG | DIASTOLIC BLOOD PRESSURE: 69 MMHG

## 2019-09-24 VITALS — SYSTOLIC BLOOD PRESSURE: 140 MMHG | DIASTOLIC BLOOD PRESSURE: 66 MMHG

## 2019-09-24 VITALS — DIASTOLIC BLOOD PRESSURE: 62 MMHG | SYSTOLIC BLOOD PRESSURE: 150 MMHG

## 2019-09-24 VITALS — SYSTOLIC BLOOD PRESSURE: 155 MMHG | DIASTOLIC BLOOD PRESSURE: 89 MMHG

## 2019-09-24 VITALS — SYSTOLIC BLOOD PRESSURE: 149 MMHG | DIASTOLIC BLOOD PRESSURE: 58 MMHG

## 2019-09-24 VITALS — SYSTOLIC BLOOD PRESSURE: 154 MMHG | DIASTOLIC BLOOD PRESSURE: 65 MMHG

## 2019-09-24 VITALS — DIASTOLIC BLOOD PRESSURE: 86 MMHG | SYSTOLIC BLOOD PRESSURE: 155 MMHG

## 2019-09-24 VITALS — DIASTOLIC BLOOD PRESSURE: 51 MMHG | SYSTOLIC BLOOD PRESSURE: 154 MMHG

## 2019-09-24 VITALS — DIASTOLIC BLOOD PRESSURE: 59 MMHG | SYSTOLIC BLOOD PRESSURE: 157 MMHG

## 2019-09-24 VITALS — DIASTOLIC BLOOD PRESSURE: 59 MMHG | SYSTOLIC BLOOD PRESSURE: 154 MMHG

## 2019-09-24 VITALS — SYSTOLIC BLOOD PRESSURE: 118 MMHG | DIASTOLIC BLOOD PRESSURE: 57 MMHG

## 2019-09-24 VITALS — DIASTOLIC BLOOD PRESSURE: 60 MMHG | SYSTOLIC BLOOD PRESSURE: 153 MMHG

## 2019-09-24 LAB
ALBUMIN SERPL-MCNC: 2.2 G/DL (ref 3.4–5)
ALP SERPL-CCNC: 107 U/L (ref 46–116)
ALT SERPL-CCNC: 58 U/L (ref 10–68)
ANION GAP SERPL CALC-SCNC: 10.2 MMOL/L (ref 8–16)
BASOPHILS NFR BLD AUTO: 0.1 % (ref 0–2)
BILIRUB SERPL-MCNC: 1.24 MG/DL (ref 0.2–1.3)
BUN SERPL-MCNC: 40 MG/DL (ref 7–18)
CALCIUM SERPL-MCNC: 7.6 MG/DL (ref 8.5–10.1)
CHLORIDE SERPL-SCNC: 108 MMOL/L (ref 98–107)
CO2 SERPL-SCNC: 27 MMOL/L (ref 21–32)
CREAT SERPL-MCNC: 1.4 MG/DL (ref 0.6–1.3)
EOSINOPHIL NFR BLD: 0 % (ref 0–7)
ERYTHROCYTE [DISTWIDTH] IN BLOOD BY AUTOMATED COUNT: 14.3 % (ref 11.5–14.5)
GLOBULIN SER-MCNC: 3.2 G/L
GLUCOSE SERPL-MCNC: 141 MG/DL (ref 74–106)
HCT VFR BLD CALC: 28.8 % (ref 42–54)
HGB BLD-MCNC: 9.7 G/DL (ref 13.5–17.5)
IMM GRANULOCYTES NFR BLD: 1.1 % (ref 0–5)
LYMPHOCYTES NFR BLD AUTO: 6.5 % (ref 15–50)
MAGNESIUM SERPL-MCNC: 2 MG/DL (ref 1.8–2.4)
MCH RBC QN AUTO: 29 PG (ref 26–34)
MCHC RBC AUTO-ENTMCNC: 33.7 G/DL (ref 31–37)
MCV RBC: 86 FL (ref 80–100)
MONOCYTES NFR BLD: 3.4 % (ref 2–11)
NEUTROPHILS NFR BLD AUTO: 88.9 % (ref 40–80)
OSMOLALITY SERPL CALC.SUM OF ELEC: 292 MOSM/KG (ref 275–300)
PHOSPHATE SERPL-MCNC: 3.1 MG/DL (ref 2.5–4.9)
PLATELET # BLD: 212 10X3/UL (ref 130–400)
PMV BLD AUTO: 8.7 FL (ref 7.4–10.4)
POTASSIUM SERPL-SCNC: 4.2 MMOL/L (ref 3.5–5.1)
PROT SERPL-MCNC: 5.4 G/DL (ref 6.4–8.2)
RBC # BLD AUTO: 3.35 10X6/UL (ref 4.2–6.1)
SODIUM SERPL-SCNC: 141 MMOL/L (ref 136–145)
VANCOMYCIN SERPL-MCNC: 12.9 UG/ML (ref 10–20)
WBC # BLD AUTO: 8.8 10X3/UL (ref 4.8–10.8)

## 2019-09-24 NOTE — NUR
REPORT RECIEVED AND ROUNDING COMPLETE. PATIENT LAYING IN BED IN LOW FOWLERS.
PATIENT HAS A RIGHT IJ WITH LR RUNNING. IJ HAS NO S/SX OF INFILTRATION OR
INFECTION AT THIS TIME. DRESSING IS C/D/I. PATIENT IS WEARING NASAL CANNULA
WITH O2 AT 2L. PATIENT IS SHOWING NO S/SX OF DISTRESS AT THIS TIME. PATIENT
STATES HE HAS NO NEEDS AT THIS TIME. PATIENT HAS A WADSWORTH WITH YELLOW CLEAR
URINE. CALL LIGHT WITHIN REACH AND BED IN LOWEST LOCKED POSITION.

## 2019-09-24 NOTE — NUR
patient laying in bed. alert and oriented. introduced myself. vss. denies pain
and needs. no distress. shift assessment done at this time. 2 l NC. pulses
palp bilat. breathing treatments being done. reinforced importance of
spirometer and flutter valve.

## 2019-09-24 NOTE — NUR
PATIENT IS UP IN CHAIR. TOLERATING WELL.. PATIENT CAN TRANSFER FROM PULMONARY
STANDPOINT. AWAITING ON DR VO. ALL LINES LABELED AND CAPPED. VSS. ALERT
AND ORIENTED. WILL CONTINUE TO MONITOR.

## 2019-09-24 NOTE — NUR
patient up in chair. family at bedside. no distress. patient is alert and
oriented. transfer orders are in. patient is persistent with spirometer and
flutter valve. vss. watching tv. tolerating diet. will conitnue to monitor
him. no BM as of this time.

## 2019-09-24 NOTE — NUR
PT RESTING WITH NO SIGNS OF ACUTE DISTRESS NOTED. VSS. WILL CONT WITH POC.
CALL LIGHT IN REACH, BED IN LOWEST POSITION.

## 2019-09-24 NOTE — NUR
RECEIVED REPORT FROM ANASTASIIA IN ICU, ROOM NOT READY FOR PT AT THIS TIME. WILL
HAVE EVS COME AND CLEAN ROOM.

## 2019-09-25 VITALS — SYSTOLIC BLOOD PRESSURE: 156 MMHG | DIASTOLIC BLOOD PRESSURE: 54 MMHG

## 2019-09-25 VITALS — SYSTOLIC BLOOD PRESSURE: 148 MMHG | DIASTOLIC BLOOD PRESSURE: 79 MMHG

## 2019-09-25 VITALS — DIASTOLIC BLOOD PRESSURE: 48 MMHG | SYSTOLIC BLOOD PRESSURE: 133 MMHG

## 2019-09-25 VITALS — SYSTOLIC BLOOD PRESSURE: 160 MMHG | DIASTOLIC BLOOD PRESSURE: 58 MMHG

## 2019-09-25 VITALS — SYSTOLIC BLOOD PRESSURE: 132 MMHG | DIASTOLIC BLOOD PRESSURE: 63 MMHG

## 2019-09-25 VITALS — SYSTOLIC BLOOD PRESSURE: 149 MMHG | DIASTOLIC BLOOD PRESSURE: 56 MMHG

## 2019-09-25 LAB
ANION GAP SERPL CALC-SCNC: 14.8 MMOL/L (ref 8–16)
BASOPHILS NFR BLD AUTO: 0.1 % (ref 0–2)
BUN SERPL-MCNC: 37 MG/DL (ref 7–18)
CALCIUM SERPL-MCNC: 7.7 MG/DL (ref 8.5–10.1)
CHLORIDE SERPL-SCNC: 107 MMOL/L (ref 98–107)
CO2 SERPL-SCNC: 23.4 MMOL/L (ref 21–32)
CREAT SERPL-MCNC: 1.4 MG/DL (ref 0.6–1.3)
EOSINOPHIL NFR BLD: 0 % (ref 0–7)
ERYTHROCYTE [DISTWIDTH] IN BLOOD BY AUTOMATED COUNT: 14.3 % (ref 11.5–14.5)
GLUCOSE SERPL-MCNC: 141 MG/DL (ref 74–106)
HCT VFR BLD CALC: 31.1 % (ref 42–54)
HGB BLD-MCNC: 10.4 G/DL (ref 13.5–17.5)
IMM GRANULOCYTES NFR BLD: 2.2 % (ref 0–5)
LYMPHOCYTES NFR BLD AUTO: 6.6 % (ref 15–50)
MCH RBC QN AUTO: 28.8 PG (ref 26–34)
MCHC RBC AUTO-ENTMCNC: 33.4 G/DL (ref 31–37)
MCV RBC: 86.1 FL (ref 80–100)
MONOCYTES NFR BLD: 3.5 % (ref 2–11)
NEUTROPHILS NFR BLD AUTO: 87.6 % (ref 40–80)
OSMOLALITY SERPL CALC.SUM OF ELEC: 291 MOSM/KG (ref 275–300)
PLATELET # BLD: 241 10X3/UL (ref 130–400)
PMV BLD AUTO: 8.9 FL (ref 7.4–10.4)
POTASSIUM SERPL-SCNC: 4.2 MMOL/L (ref 3.5–5.1)
RBC # BLD AUTO: 3.61 10X6/UL (ref 4.2–6.1)
SODIUM SERPL-SCNC: 141 MMOL/L (ref 136–145)
VANCOMYCIN SERPL-MCNC: 14.7 UG/ML (ref 10–20)
WBC # BLD AUTO: 12.6 10X3/UL (ref 4.8–10.8)

## 2019-09-25 NOTE — NUR
PT UP TO CHAIR FOR LUNCH, PT DID WELL TRANSFERING FROM BED TO CHAIR. PT DENIES
ANY NEEDS AT THIS TIME. CALL LIGHT IN REACH, NAD NOTED, WILL CONTINUE TO
MONITOR.

## 2019-09-25 NOTE — NUR
PT DOES NOT MEET CRITIRIA TO HAVE WADSWORTH CATHETER. REMOVED WADSWORTH CATHETER WITH
CATHETER TIP INTACT. REMOVED 10CC OF FLUID FROM BALLOON. ALSO PROVIDED
DRESSING CHANGE TO RT IJ CENTRAL LINE, USING STERILE TECHNIQUE. PT UP TO
CHAIR, DENIES ANY NEEDS AT THIS TIME. CALL LIGHT IN REACH, NAD NOTED, WILL
CONTINUE TO MONITOR.

## 2019-09-25 NOTE — NUR
AM MEDS GIVEN AT THIS TIME. PT IN BED, EATING BREAKFAST, PT A/O X4, RESP EVEN
BUT A LITTLE SHALLOW ON 2L NC. RT IJ INFUSING NS AT 75CC/HR. PT DENIES ANY
NEEDS AT THIS TIME, CALL LIGHT IN REACH, NAD NOTED, WILL CONTINUE TO MONITOR.

## 2019-09-25 NOTE — NUR
PT ALERT, SITTING IN CHAIR @ BEDSIDE WATCHING TV, RIGHT IJ CVL INTACT WITH LR
@ 50CC/HR, URINAL IN REACH, PT VOIDING WITHOUT DIFFICULTY, EDEMA NOTED TO BLE,
NO C/O @ THIS TIME

## 2019-09-26 VITALS — SYSTOLIC BLOOD PRESSURE: 144 MMHG | DIASTOLIC BLOOD PRESSURE: 53 MMHG

## 2019-09-26 VITALS — DIASTOLIC BLOOD PRESSURE: 49 MMHG | SYSTOLIC BLOOD PRESSURE: 138 MMHG

## 2019-09-26 VITALS — SYSTOLIC BLOOD PRESSURE: 141 MMHG | DIASTOLIC BLOOD PRESSURE: 49 MMHG

## 2019-09-26 VITALS — DIASTOLIC BLOOD PRESSURE: 61 MMHG | SYSTOLIC BLOOD PRESSURE: 173 MMHG

## 2019-09-26 VITALS — DIASTOLIC BLOOD PRESSURE: 51 MMHG | SYSTOLIC BLOOD PRESSURE: 149 MMHG

## 2019-09-26 LAB
ANION GAP SERPL CALC-SCNC: 10.3 MMOL/L (ref 8–16)
BASOPHILS NFR BLD AUTO: 0 % (ref 0–2)
BUN SERPL-MCNC: 36 MG/DL (ref 7–18)
CALCIUM SERPL-MCNC: 7.3 MG/DL (ref 8.5–10.1)
CHLORIDE SERPL-SCNC: 107 MMOL/L (ref 98–107)
CO2 SERPL-SCNC: 26.7 MMOL/L (ref 21–32)
CREAT SERPL-MCNC: 1.3 MG/DL (ref 0.6–1.3)
EOSINOPHIL NFR BLD: 0.5 % (ref 0–7)
ERYTHROCYTE [DISTWIDTH] IN BLOOD BY AUTOMATED COUNT: 14.2 % (ref 11.5–14.5)
GLUCOSE SERPL-MCNC: 101 MG/DL (ref 74–106)
HCT VFR BLD CALC: 28.9 % (ref 42–54)
HGB BLD-MCNC: 9.9 G/DL (ref 13.5–17.5)
IMM GRANULOCYTES NFR BLD: 1.7 % (ref 0–5)
INR PPP: 1.41 (ref 0.85–1.17)
LYMPHOCYTES NFR BLD AUTO: 10.3 % (ref 15–50)
MCH RBC QN AUTO: 29.4 PG (ref 26–34)
MCHC RBC AUTO-ENTMCNC: 34.3 G/DL (ref 31–37)
MCV RBC: 85.8 FL (ref 80–100)
MONOCYTES NFR BLD: 4.2 % (ref 2–11)
NEUTROPHILS NFR BLD AUTO: 83.3 % (ref 40–80)
OSMOLALITY SERPL CALC.SUM OF ELEC: 286 MOSM/KG (ref 275–300)
PLATELET # BLD: 193 10X3/UL (ref 130–400)
PMV BLD AUTO: 8.4 FL (ref 7.4–10.4)
POTASSIUM SERPL-SCNC: 4 MMOL/L (ref 3.5–5.1)
PROTHROMBIN TIME: 16.6 SECONDS (ref 11.6–15)
RBC # BLD AUTO: 3.37 10X6/UL (ref 4.2–6.1)
SODIUM SERPL-SCNC: 140 MMOL/L (ref 136–145)
VANCOMYCIN SERPL-MCNC: 15.7 UG/ML (ref 10–20)
WBC # BLD AUTO: 11.6 10X3/UL (ref 4.8–10.8)

## 2019-09-26 NOTE — NUR
REPORT RECIEVED. PT SITTING UP IN BED. HE HAS A R IJ INFUSING LR @ 75. HES
CURRENTLY WEARING 2L NC. RR EVEN AND UNLABORED. BED LOCKED AND IN LOWEST
POSITION CALL LIGHT WITHIN REACH. WILL CTM

## 2019-09-27 VITALS — DIASTOLIC BLOOD PRESSURE: 45 MMHG | SYSTOLIC BLOOD PRESSURE: 156 MMHG

## 2019-09-27 VITALS — DIASTOLIC BLOOD PRESSURE: 42 MMHG | SYSTOLIC BLOOD PRESSURE: 134 MMHG

## 2019-09-27 VITALS — DIASTOLIC BLOOD PRESSURE: 45 MMHG | SYSTOLIC BLOOD PRESSURE: 147 MMHG

## 2019-09-27 VITALS — DIASTOLIC BLOOD PRESSURE: 53 MMHG | SYSTOLIC BLOOD PRESSURE: 148 MMHG

## 2019-09-27 LAB
ANION GAP SERPL CALC-SCNC: 10.5 MMOL/L (ref 8–16)
BASOPHILS NFR BLD AUTO: 0 % (ref 0–2)
BUN SERPL-MCNC: 36 MG/DL (ref 7–18)
CALCIUM SERPL-MCNC: 7.8 MG/DL (ref 8.5–10.1)
CHLORIDE SERPL-SCNC: 107 MMOL/L (ref 98–107)
CO2 SERPL-SCNC: 26.6 MMOL/L (ref 21–32)
CREAT SERPL-MCNC: 1.2 MG/DL (ref 0.6–1.3)
EOSINOPHIL NFR BLD: 0.5 % (ref 0–7)
ERYTHROCYTE [DISTWIDTH] IN BLOOD BY AUTOMATED COUNT: 14.7 % (ref 11.5–14.5)
GLUCOSE SERPL-MCNC: 105 MG/DL (ref 74–106)
HCT VFR BLD CALC: 28.5 % (ref 42–54)
HGB BLD-MCNC: 9.3 G/DL (ref 13.5–17.5)
IMM GRANULOCYTES NFR BLD: 1.6 % (ref 0–5)
INR PPP: 1.27 (ref 0.85–1.17)
LYMPHOCYTES NFR BLD AUTO: 9.1 % (ref 15–50)
MCH RBC QN AUTO: 29.2 PG (ref 26–34)
MCHC RBC AUTO-ENTMCNC: 32.6 G/DL (ref 31–37)
MCV RBC: 89.6 FL (ref 80–100)
MONOCYTES NFR BLD: 5.7 % (ref 2–11)
NEUTROPHILS NFR BLD AUTO: 83.1 % (ref 40–80)
OSMOLALITY SERPL CALC.SUM OF ELEC: 286 MOSM/KG (ref 275–300)
PLATELET # BLD: 201 10X3/UL (ref 130–400)
PMV BLD AUTO: 8.9 FL (ref 7.4–10.4)
POTASSIUM SERPL-SCNC: 4.1 MMOL/L (ref 3.5–5.1)
PROTHROMBIN TIME: 15.3 SECONDS (ref 11.6–15)
RBC # BLD AUTO: 3.18 10X6/UL (ref 4.2–6.1)
SODIUM SERPL-SCNC: 140 MMOL/L (ref 136–145)
VANCOMYCIN SERPL-MCNC: 17 UG/ML (ref 10–20)
WBC # BLD AUTO: 9.4 10X3/UL (ref 4.8–10.8)

## 2019-09-27 NOTE — NUR
Nutrition Follow-up:
Pt reports overall good/fair appetite/PO intake. Drinking ~3 Ensure/day. Per
ST, upgraded to solids with thin liquids. Noted plan to d/c today.
Diet: Regular, Ensure with meals
PO intake: %
Wt: 172#
Last BM: 9/27
Labs reviewed
Meds reviewed
RD following.

## 2019-09-27 NOTE — NUR
DC PAPERWORK GONE OVER AND SIGNED WITH PT. ALL QUESTIONS ANSWERED. TELEMETRY
REMOVED AND RETURNED TO TO MONITOR TECH. CVL PULLED BY BIBI ROSAS. PT HAS LAID
FLAT FOR 20 MINS FOLLOWING REMOVAL. NO S/SX OF BLEEDING. PT ESCORTED TO FRONT
ENTRANCE VIA WHEELCHAIR WITH WIFE. ALL VALUBLES TAKEN OUT OF ROOM

## 2019-09-27 NOTE — NUR
REPORT RECIEVED. PT SITTING UP IN BED GETTING AN UPDRAFT TREATMENT AT THIS
TIME. RR EVEN AND UNLABORED. HE HAS A R IJ CVL THAT IS SL. HE IS A & O. BED
LOCKED AND IN LOWEST POSITION. CALL LIGHT WITHIN REACH. WILL CTM

## 2019-09-27 NOTE — MORECARE
CASE MANAGEMENT DISCHARGE SUMMARY
 
 
PATIENT: CHINEDU DEL TORO                       UNIT: [id number]
ACCOUNT#: [id number]                       ADM DATE: 19
AGE: 89     : 30  SEX: M            ROOM/BED: D.2134    
AUTHOR: MAYODOC                             PHYSICIAN:                               
 
REFERRING PHYSICIAN: STAN COVARRUBIAS MD           
DATE OF SERVICE: 19
Discharge Plan
 
 
Patient Name: CHINEDU DEL TORO
[facility]
Encounter #: [id number]
Medical Record #: [id number]
: 1930
Planned Disposition: Home or Self Care
Anticipated Discharge Date: 
 
Discharge Date: 2019
Expected LOS: 
Initial Reviewer: [email or web address]
Initial Review Date: 2019
Generated: 19   3:46 pm 
DCP- Discharge Planning
 
Updated by [id number]: Abbi Pike on 19   8:35 pm CT
Patient Name: CHINEDU DEL TORO                                     
Admission Status: Elective   
Accout number: [id number]                              
Admission Date: 2019   
: 1930                                                        
Admission Diagnosis:   
Attending: SHAMIR COVARRUBIAS                                                
Current LOS:  3   
  
Anticipated DC Date:    
Planned Disposition: Home or Self Care   
Primary Insurance: MEDICARE A & B   
  
  
Discharge Planning Comments: CM met with patient at bedside after explaining 
CM role and obtaining verbal consent. Patient lives at home with his 
significant other Katelynn where he is independent with his care and plans to 
return there upon discharge.  Patient feels this would be a safe discharge.  
CM discussed availability / needs of home health and medical equipment.  
Patient states he has [facility]AP and Nebulizer [facility] 
Patient denies any discharge needs at this time. Patient states he will have 
 
his family drive him home upon discharge.  CM will continue to follow and 
assist as needed with discharge planning / needs.  
  
  
  
  
  
  
: Abbi Pike
DCP- Discharge Planning
 
Updated by [id number]: Abbi Pike on 19   4:22 pm CT
CM attempted to visit with patient regarding discharge planning/ needs. 
Patient currently on vent no family available. CM will continue to follow and 
assist as needed with discharge planning / needs
 DCPIA - Discharge Planning Initial Assessment
 
Updated by [id number]: Abbi Pike on 19   9:32 pm
*  Is the patient Alert and Oriented?
Yes
*  How many steps to enter\exit or inside your home?
 
*  [facility]
 
*  Preadmission Environment
Home with Family
*  ADLs
Independent
*  Equipment
Nebulizer
*  Other Equipment
CPAP, CANE
*  List name and contact numbers for known caregivers / representatives who 
currently or will assist patient after discharge:
KATELYNN LAM - SIGNIFICANT OTHER- [phone]  KACIE VASQUEZ - [phone]
*  Verbal permission to speak to the caregivers and representatives has been 
obtained from the patient.
No
*  Community resources currently utilized
None
*  Additional services required to return to the preadmission environment?
No
*  Can the patient safely return to the preadmission environment?
Yes
*  Has this patient been hospitalized within the prior 30 days at any 
hospital?
No
 
 
 
 
 
 
Coverage Notice
 
Reviewer: [email or web address] Christina Richards
 
Notice Issued Date-Time: 2019  11:33
Notice Type: IM Discharge Notice
 
Notice Delivered To: Patient
Relationship to Patient: 
Representative Name: 
 
Delivery Method: HAND - Hand Delivered
Sarah Days:
Prior Verbal Notification: 
 
Recipient Understood Notice: Yes
Recipient Signature: Yes
Med Rec Note Co-signed by Attending:
 
Coverage Notice Comment:  
 
Last DP export: 19   8:40 p
Patient Name: CHINEDU DEL TORO
Encounter #: [id number]
Page 13610
 
 
 
 
 
Electronically Signed by KATHY HUBER on 19 at 1447
 
 
 
 
 
 
**All edits/amendments must be made on the electronic document**
 
DICTATION DATE: 19     : CHRIS  19     
RPT#: [id number]                                DC DATE:19
                                               STATUS: DIS IN  
[facility]
[address] [place] [address]
***END OF REPORT***